# Patient Record
(demographics unavailable — no encounter records)

---

## 2024-11-15 NOTE — PHYSICAL EXAM

## 2024-11-15 NOTE — HISTORY OF PRESENT ILLNESS
[Mother] : mother [Finger Foods] : finger foods [Normal] : Normal [In nursery school] : In nursery school [Car seat in back seat] : Car seat in back seat [de-identified] : well balanced and varied, self feeding, occasional breastfeeding

## 2024-11-15 NOTE — HISTORY OF PRESENT ILLNESS
[Mother] : mother [Finger Foods] : finger foods [Normal] : Normal [In nursery school] : In nursery school [Car seat in back seat] : Car seat in back seat [de-identified] : well balanced and varied, self feeding, occasional breastfeeding

## 2024-11-15 NOTE — DEVELOPMENTAL MILESTONES
[Normal Development] : Normal Development [None] : none [Plays alongside other children] : plays alongside other children [Takes off some clothing] : takes off some clothing [Uses 50 words] : uses 50 words [Combine 2 words into phrase or] : combines 2 words into phrase or sentences [Kicks ball] : kicks ball  [Jumps off ground with 2 feet] : jumps off ground with 2 feet [Stacks objects] : stacks objects [Turns book pages] : turns book pages [Passed] : passed [FreeTextEntry1] : 0

## 2024-11-15 NOTE — PHYSICAL EXAM

## 2024-12-16 NOTE — REVIEW OF SYSTEMS
[Fever] : fever [Malaise] : malaise [Nasal Discharge] : nasal discharge [Nasal Congestion] : nasal congestion [Cough] : cough [Congestion] : congestion [Appetite Changes] : appetite changes [Irritable] : irritability [Fussy] : fussy [Difficulty with Sleep] : difficulty with sleep [Negative] : Heme/Lymph [Inconsolable] : consolable [Eye Discharge] : no eye discharge [Eye Redness] : no eye redness [Increased Lacrimation] : no increased lacrimation [Snoring] : no snoring [Mouth Breathing] : no mouth breathing [Sore Throat] : no sore throat [Tachypnea] : not tachypneic [Wheezing] : no wheezing [Intolerance to feeds] : tolerance to feeds [Vomiting] : no vomiting [Constipation] : no constipation [Gaseous] : not gaseous [Abdominal Pain] : no abdominal pain

## 2024-12-16 NOTE — HISTORY OF PRESENT ILLNESS
[Cool compress] : cool compress [Last dose: _____] : Last dose: [unfilled] [Teething] : no teething [Improving] : improving [EENT/Resp Symptoms] : EENT/RESPIRATORY SYMPTOMS [Fever] : fever [Nasal congestion] : nasal congestion [Runny nose] : runny nose [Chest congestion] : chest congestion [Intermittent] : intermittent [Consolable] : consolable [Decreased appetite] : decreased appetite [Sick Contacts: ___] : sick contacts: [unfilled] [Clear rhinorrhea] : clear rhinorrhea [Mucoid discharge] : mucoid discharge [Wet cough] : wet cough [Ibuprofen] : ibuprofen [Change in sleep pattern] : change in sleep pattern [Runny Nose] : runny nose [Nasal Congestion] : nasal congestion [Cough] : cough [Decreased Appetite] : decreased appetite [Decreased Urine Output] : decreased urine output [Max Temp: ____] : Max temperature: [unfilled] [Stable] : stable [___ Hour(s)] : [unfilled] hour(s) [___ Day(s)] : [unfilled] day(s) [Known Exposure to COVID-19] : no known exposure to COVID-19 [Hx of recent COVID-19 infection] : no history of recent COVID-19 infection [Eye Redness] : no eye redness [Eye Discharge] : no eye discharge [Ear Tugging] : no ear tugging [Wheezing] : no wheezing [Posttussive emesis] : no posttussive emesis [Vomiting] : no vomiting [Diarrhea] : no diarrhea [Rash] : no rash [de-identified] : Fever, Cough

## 2024-12-16 NOTE — HISTORY OF PRESENT ILLNESS
[Cool compress] : cool compress [Last dose: _____] : Last dose: [unfilled] [Teething] : no teething [Improving] : improving [EENT/Resp Symptoms] : EENT/RESPIRATORY SYMPTOMS [Fever] : fever [Nasal congestion] : nasal congestion [Runny nose] : runny nose [Chest congestion] : chest congestion [Intermittent] : intermittent [Consolable] : consolable [Decreased appetite] : decreased appetite [Sick Contacts: ___] : sick contacts: [unfilled] [Clear rhinorrhea] : clear rhinorrhea [Mucoid discharge] : mucoid discharge [Wet cough] : wet cough [Ibuprofen] : ibuprofen [Change in sleep pattern] : change in sleep pattern [Runny Nose] : runny nose [Nasal Congestion] : nasal congestion [Cough] : cough [Decreased Appetite] : decreased appetite [Decreased Urine Output] : decreased urine output [Max Temp: ____] : Max temperature: [unfilled] [Stable] : stable [___ Hour(s)] : [unfilled] hour(s) [___ Day(s)] : [unfilled] day(s) [Known Exposure to COVID-19] : no known exposure to COVID-19 [Hx of recent COVID-19 infection] : no history of recent COVID-19 infection [Eye Redness] : no eye redness [Eye Discharge] : no eye discharge [Ear Tugging] : no ear tugging [Wheezing] : no wheezing [Posttussive emesis] : no posttussive emesis [Vomiting] : no vomiting [Diarrhea] : no diarrhea [Rash] : no rash [de-identified] : Fever, Cough

## 2024-12-16 NOTE — PHYSICAL EXAM
[Alert] : alert [Tired appearing] : tired appearing [Consolable] : consolable [Normocephalic] : normocephalic [Cerumen in canal] : cerumen in canal [Mucoid Discharge] : mucoid discharge [Clear to Auscultation Bilaterally] : clear to auscultation bilaterally [Tachypnea] : tachypnea [NL] : warm, clear [Acute Distress] : no acute distress [Lethargic] : not lethargic [Irritable] : not irritable [Toxic] : not toxic [Stridor] : no stridor [Discharge in canal] : no discharge in canal [Pain with manipulation of pinna] : no pain with manipulation of pinna [Inflammation of canal] : no inflammation of canal [Erythema of canal] : no erythema of canal [Erythematous Oropharynx] : nonerythematous oropharynx [Wheezing] : no wheezing [Rales] : no rales [Rhonchi] : no rhonchi [Subcostal Retractions] : no subcostal retractions [Suprasternal Retractions] : no suprasternal retractions

## 2024-12-17 NOTE — ADDENDUM
[FreeTextEntry1] : Additional 12 minutes spent on the phone speaking with family.  Child found to have influenza A.  Discussed pros and cons of starting Tamiflu.  We will start given the presence of grandparents in the home (though it sounds like they have already started having symptoms).  We will continue to stay in touch..

## 2024-12-17 NOTE — HISTORY OF PRESENT ILLNESS
[FreeTextEntry6] :  Summary: 2-year-old female presenting with persistent high fever for 5 days, accompanied by cough and nasal congestion.  Chief Complaint (CC): Fever for 5 days, reaching up to 104.1F, with cough and nasal congestion.  History of Present Illness: Mark, a 2-year-old female, presented with her parents due to persistent high fever for 5 days. Symptoms began on Thursday night with clinginess and low appetite. On Friday morning, her temperature peaked at 104.1F. She has been experiencing a junky cough and significant nasal discharge. The patient has a history of ear infections and walking pneumonia. Parents have been alternating between Motrin and Tylenol for fever management. Mark's older sister, Anna, was recently ill as well.  Past Medical History: History of ear infections and walking pneumonia.  Family History:     - Sister (Anna) recently ill with similar symptoms  Social History:     - Lives with parents and older sister  Review of Systems:     - General: Fever, decreased appetite, fatigue     - Respiratory: Junky cough, nasal congestion with significant discharge     - Gastrointestinal: Decreased appetite     - Genitourinary: Possible discomfort in genital area (pointed to 'Neena' before bath)  Medications:     - Children's Tylenol 5 mL     - Motrin 5 mL (alternating with Tylenol)

## 2024-12-17 NOTE — DISCUSSION/SUMMARY
[FreeTextEntry1] :  Assessment and Plan:  Viral Upper Respiratory Infection: Patient presents with symptoms consistent with a viral upper respiratory infection, including high fever, cough, and nasal congestion. Clear lung sounds and absence of ear infection support this diagnosis.     - Continue alternating Tylenol and Motrin every 3 hours for fever management      - Increase dosage of fever reducers based on weight (approximately 5.5 mL)      - Encourage fluid intake to prevent dehydration and thin mucus      - Monitor for worsening symptoms or development of new concerns      - Return for follow-up or seek immediate care if condition deteriorates      - Consider chest X-ray if symptoms persist or worsen to rule out pneumonia      - Educate parents on expected course of illness and signs of complications      - Follow up in 24-48 hours if fever persists or symptoms worsen

## 2024-12-17 NOTE — ADDENDUM
[FreeTextEntry1] : Additional 12 minutes spent on the phone speaking with family.  Child found to have influenza A.  Discussed pros and cons of starting Tamiflu.  We will start given the presence of grandparents in the home (though it sounds like they have already started having symptoms).  We will continue to stay in touch.. [Follow-Up Visit] : a follow-up [Other: _____] : [unfilled]

## 2025-03-03 NOTE — PHYSICAL EXAM

## 2025-03-05 NOTE — HISTORY OF PRESENT ILLNESS
[Cereal] : cereal [Table food] : table food [Dairy] : dairy [Playtime 60 min a day] : Playtime 60 min a day [Toilet Training] : Toilet training [At risk for exposure to TB] : Not at risk for exposure to Tuberculosis [Wakes up at night] : Wakes up at night [Brushing teeth] : Brushing teeth [Mother] : mother [Fruit] : fruit [Vegetables] : vegetables [Meat] : meat [Grains] : grains [Eggs] : eggs [Fish] : fish [Dairy] : dairy [___ stools per day] : [unfilled]  stools per day [___ voids per day] : [unfilled] voids per day [Normal] : Normal [In crib] : In crib [Sippy cup use] : Sippy cup use [Yes] : Patient goes to dentist yearly [In nursery school] : In nursery school [Playtime (60 min/d)] : Playtime 60 min a day [Temper Tantrums] : Temper Tantrums [< 2 hrs of screen time] : Less than 2 hrs of screen time [No] : Not at  exposure [Water heater temperature set at <120 degrees F] : Water heater temperature set at <120 degrees F [Car seat in back seat] : Car seat in back seat [Carbon Monoxide Detectors] : Carbon monoxide detectors [Smoke Detectors] : Smoke detectors [Supervised play near cars and streets] : Supervised play near cars and streets [NO] : No [FreeTextEntry3] : shares room with sister [Exposure to electronic nicotine delivery system] : No exposure to electronic nicotine delivery system [FreeTextEntry7] :  no UC, ED, or hospital visits in interval [FreeTextEntry9] : punching when angry [FreeTextEntry1] : 1yo F presenting for WCC.  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  Previously adopted stray cat in household, now vaccinated.  Patient No other concerns.

## 2025-03-05 NOTE — DISCUSSION/SUMMARY
[Normal Growth] : growth [Normal Development] : development [None] : No known medical problems [No Elimination Concerns] : elimination [No Feeding Concerns] : feeding [No Skin Concerns] : skin [Normal Sleep Pattern] : sleep [No Medications] : ~He/She~ is not on any medications [Parent/Guardian] : parent/guardian [] : The components of the vaccine(s) to be administered today are listed in the plan of care. The disease(s) for which the vaccine(s) are intended to prevent and the risks have been discussed with the caretaker.  The risks are also included in the appropriate vaccination information statements which have been provided to the patient's caregiver.  The caregiver has given consent to vaccinate. [FreeTextEntry1] : 30mo F presenting for WCC.  Weight in 60%tile. Height today [id number]. Meeting all developmental milestones. No growth or developmental concerns at this time. Patient with temper tantrums with punching at home. Will refer to [facility] to provide additional resources for coping and redirection strategies.   HepA vaccine booster given today.   Age-appropriate anticipatory guidance discussed. Continue cow's milk. Continue table foods, 3 meals with 2-3 snacks per day. Incorporate fluorinated water daily in a sippy cup. Brush teeth twice a day with soft toothbrush. Recommend visit to dentist. When in car, keep child in rear-facing car seats until age 2, or until the maximum height and weight for seat is reached. Put toddler to sleep in own bed. Help toddler to maintain consistent daily routines and sleep schedule. Toilet training discussed. Ensure home is safe. Use consistent, positive discipline. Read aloud to toddler. Limit screen time to no more than 2 hours per day.   MOC verbalized understanding and agreement with all aspects of discussion and plan.  Advised to schedule RTO for next WCC.

## 2025-03-05 NOTE — DEVELOPMENTAL MILESTONES
[Plays alongside other children] : plays alongside other children [Takes off some clothing] : takes off some clothing [Scoops well with spoon] : scoops well with spoon [Uses 50 words] : uses 50 words [Combine 2 words into phrase or] : combines 2 words into phrase or sentences [Follows 2-step command] : follows 2-step command [Uses words that are 50% intelligible] : uses words that are 50% intelligible to strangers [Kicks ball] : kicks ball  [Jumps off ground with 2 feet] : jumps off ground with 2 feet [Runs with coordination] : runs with coordination [Climbs up a ladder at a] : climbs up a ladder at a playground [Stacks objects] : stacks objects [Turns book pages] : turns book pages [Uses hands to turn objects] : uses hands to turn objects [Normal Development] : Normal Development [Plays pretend with toys or dolls] : plays pretend with toys or dolls [Pokes food with fork] : pokes food with fork [Uses pronouns correctly] : uses pronouns correctly [Explains the reason for things,] : explains the reason for things, such as needing a sweater when it's cold [Names at least one color] : names at least one color [Walks up steps, using one] : walks up steps, using one foot, then the other [Runs well without falling] : runs well without falling [Grasps crayon with thumb] : grasps crayon with thumb and fingers instead of fist [Catches a large ball] : catches a large ball [Copies a vertical line] : copies a vertical line [Urinates in a potty or toilet] : does not urinate in a potty or toilet

## 2025-03-05 NOTE — HISTORY OF PRESENT ILLNESS
[Cereal] : cereal [Table food] : table food [Dairy] : dairy [Playtime 60 min a day] : Playtime 60 min a day [Toilet Training] : Toilet training [At risk for exposure to TB] : Not at risk for exposure to Tuberculosis [Wakes up at night] : Wakes up at night [Brushing teeth] : Brushing teeth [Mother] : mother [Fruit] : fruit [Vegetables] : vegetables [Meat] : meat [Grains] : grains [Eggs] : eggs [Fish] : fish [Dairy] : dairy [___ stools per day] : [unfilled]  stools per day [___ voids per day] : [unfilled] voids per day [Normal] : Normal [In crib] : In crib [Sippy cup use] : Sippy cup use [Yes] : Patient goes to dentist yearly [In nursery school] : In nursery school [Playtime (60 min/d)] : Playtime 60 min a day [Temper Tantrums] : Temper Tantrums [< 2 hrs of screen time] : Less than 2 hrs of screen time [No] : Not at  exposure [Water heater temperature set at <120 degrees F] : Water heater temperature set at <120 degrees F [Car seat in back seat] : Car seat in back seat [Carbon Monoxide Detectors] : Carbon monoxide detectors [Smoke Detectors] : Smoke detectors [Supervised play near cars and streets] : Supervised play near cars and streets [NO] : No [FreeTextEntry3] : shares room with sister [Exposure to electronic nicotine delivery system] : No exposure to electronic nicotine delivery system [FreeTextEntry7] :  no UC, ED, or hospital visits in interval [FreeTextEntry9] : punching when angry [FreeTextEntry1] : 3yo F presenting for WCC.  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  Previously adopted stray cat in household, now vaccinated.  Patient No other concerns.

## 2025-03-05 NOTE — DISCUSSION/SUMMARY
[Normal Growth] : growth [Normal Development] : development [None] : No known medical problems [No Elimination Concerns] : elimination [No Feeding Concerns] : feeding [No Skin Concerns] : skin [Normal Sleep Pattern] : sleep [No Medications] : ~He/She~ is not on any medications [Parent/Guardian] : parent/guardian [] : The components of the vaccine(s) to be administered today are listed in the plan of care. The disease(s) for which the vaccine(s) are intended to prevent and the risks have been discussed with the caretaker.  The risks are also included in the appropriate vaccination information statements which have been provided to the patient's caregiver.  The caregiver has given consent to vaccinate. [FreeTextEntry1] : 30mo F presenting for WCC.  Weight in 60%tile. Height today [id number]. Meeting all developmental milestones. No growth or developmental concerns at this time. Patient with temper tantrums with punching at home. Will refer to [facility] to provide additional resources for coping and redirection strategies.   HepA vaccine booster given today.   Age-appropriate anticipatory guidance discussed. Continue cow's milk. Continue table foods, 3 meals with 2-3 snacks per day. Incorporate fluorinated water daily in a sippy cup. Brush teeth twice a day with soft toothbrush. Recommend visit to dentist. When in car, keep child in rear-facing car seats until age 2, or until the maximum height and weight for seat is reached. Put toddler to sleep in own bed. Help toddler to maintain consistent daily routines and sleep schedule. Toilet training discussed. Ensure home is safe. Use consistent, positive discipline. Read aloud to toddler. Limit screen time to no more than 2 hours per day.   MOC verbalized understanding and agreement with all aspects of discussion and plan.  Advised to schedule RTO for next WCC.

## 2025-03-05 NOTE — DISCUSSION/SUMMARY
[Normal Growth] : growth [Normal Development] : development [None] : No known medical problems [No Elimination Concerns] : elimination [No Feeding Concerns] : feeding [No Skin Concerns] : skin [Normal Sleep Pattern] : sleep [No Medications] : ~He/She~ is not on any medications [Parent/Guardian] : parent/guardian [] : The components of the vaccine(s) to be administered today are listed in the plan of care. The disease(s) for which the vaccine(s) are intended to prevent and the risks have been discussed with the caretaker.  The risks are also included in the appropriate vaccination information statements which have been provided to the patient's caregiver.  The caregiver has given consent to vaccinate. [FreeTextEntry1] : 30mo F presenting for WCC.  Weight in 60%tile. Height today [id number]. Meeting all developmental milestones. No growth or developmental concerns at this time. Patient with temper tantrums with punching at home. Will refer to [facility] to provide additional resources for coping and redirection strategies.   HepA vaccine booster given today.   Age-appropriate anticipatory guidance discussed. Continue cow's milk. Continue table foods, 3 meals with 2-3 snacks per day. Incorporate fluorinated water daily in a sippy cup. Brush teeth twice a day with soft toothbrush. Recommend visit to dentist. When in car, keep child in rear-facing car seats until age 2, or until the maximum height and weight for seat is reached. Put toddler to sleep in own bed. Help toddler to maintain consistent daily routines and sleep schedule. Toilet training discussed. Ensure home is safe. Use consistent, positive discipline. Read aloud to toddler. Limit screen time to no more than 2 hours per day.   MOC verbalized understanding and agreement with all aspects of discussion and plan.  Advised to schedule RTO for next WCC.